# Patient Record
Sex: FEMALE | Race: ASIAN | Employment: FULL TIME | URBAN - METROPOLITAN AREA
[De-identification: names, ages, dates, MRNs, and addresses within clinical notes are randomized per-mention and may not be internally consistent; named-entity substitution may affect disease eponyms.]

---

## 2020-10-27 ENCOUNTER — OFFICE VISIT (OUTPATIENT)
Dept: PRIMARY CARE CLINIC | Age: 35
End: 2020-10-27
Payer: COMMERCIAL

## 2020-10-27 VITALS
WEIGHT: 134.4 LBS | RESPIRATION RATE: 16 BRPM | HEART RATE: 81 BPM | TEMPERATURE: 98.6 F | HEIGHT: 62 IN | SYSTOLIC BLOOD PRESSURE: 124 MMHG | BODY MASS INDEX: 24.73 KG/M2 | DIASTOLIC BLOOD PRESSURE: 87 MMHG | OXYGEN SATURATION: 98 %

## 2020-10-27 DIAGNOSIS — J30.89 ENVIRONMENTAL AND SEASONAL ALLERGIES: ICD-10-CM

## 2020-10-27 DIAGNOSIS — Z23 NEED FOR DIPHTHERIA-TETANUS-PERTUSSIS (TDAP) VACCINE: ICD-10-CM

## 2020-10-27 DIAGNOSIS — Z12.4 CERVICAL CANCER SCREENING: ICD-10-CM

## 2020-10-27 DIAGNOSIS — Z00.00 PHYSICAL EXAM: Primary | ICD-10-CM

## 2020-10-27 DIAGNOSIS — Z23 NEEDS FLU SHOT: ICD-10-CM

## 2020-10-27 DIAGNOSIS — E55.9 VITAMIN D DEFICIENCY: ICD-10-CM

## 2020-10-27 PROCEDURE — 90686 IIV4 VACC NO PRSV 0.5 ML IM: CPT | Performed by: INTERNAL MEDICINE

## 2020-10-27 PROCEDURE — 99385 PREV VISIT NEW AGE 18-39: CPT | Performed by: INTERNAL MEDICINE

## 2020-10-27 PROCEDURE — 90471 IMMUNIZATION ADMIN: CPT | Performed by: INTERNAL MEDICINE

## 2020-10-27 NOTE — PROGRESS NOTES
Chief Complaint   Patient presents with   [address]     no concern just moved to the area of 2018 and needs to establish a pcp

## 2020-10-27 NOTE — PROGRESS NOTES
Written by Damari Dobson, as dictated by Dr. Amanuel Louis MD.    Kylee Smalls is a 29 y.o. female. HPI  Pt presents today to establish care. She moved to the area in 2018 and is looking for a PCP. She has 2 sons and works as a  with [address]. Her  works for [address] [employer] . She has no known fhx of cancer and her parents are alive and healthy. She exercises daily by walk-jogging 3 miles, and she is vegetarian. She does not drink alcohol. She has seasonal allergies which cause sore throat sometimes. She takes Zyrtec for this and does not have drowsiness sfx from it. She does not have any issues with constipation of heartburn. Her menstrual cycle is regular and normal, and she does not have any issues with it. She gets leg cramps on the first day of her menstrual cycle. Both of her sons were vaginal deliveries. She does not remember her last Tdap, and the youngest child is 11years old. Her last Pap smear was 5 years ago as well. No current outpatient medications on file prior to visit. No current facility-administered medications on file prior to visit. No Known Allergies    History reviewed. No pertinent past medical history. History reviewed. No pertinent surgical history. History reviewed. No pertinent family history.     Social History     Socioeconomic History    Marital status:      Spouse name: Not on file    Number of children: Not on file    Years of education: Not on file    Highest education level: Not on file   Occupational History    Not on file   Social Needs    Financial resource strain: Not on file    Food insecurity     Worry: Not on file     Inability: Not on file    Transportation needs     Medical: Not on file     Non-medical: Not on file   Tobacco Use    Smoking status: Never Smoker    Smokeless tobacco: Never Used   Substance and Sexual Activity    Alcohol use: Never Frequency: Never    Drug use: Never    Sexual activity: Yes     Partners: Male   Lifestyle    Physical activity     Days per week: Not on file     Minutes per session: Not on file    Stress: Not on file   Relationships    Social connections     Talks on phone: Not on file     Gets together: Not on file     Attends [employer] service: Not on file     Active member of club or organization: Not on file     Attends meetings of clubs or organizations: Not on file     Relationship status: Not on file    Intimate partner violence     Fear of current or ex partner: Not on file     Emotionally abused: Not on file     Physically abused: Not on file     Forced sexual activity: Not on file   Other Topics Concern    Not on file   Social History Narrative    Not on file       No results found for any previous visit. Review of Systems   Constitutional: Negative for malaise/fatigue and weight loss. HENT: Negative for congestion and sore throat. Eyes: Negative for blurred vision. Respiratory: Negative for cough and shortness of breath. Cardiovascular: Negative for chest pain and leg swelling. Gastrointestinal: Negative for constipation and heartburn. Genitourinary: Negative for frequency and urgency. Musculoskeletal: Negative for back pain, joint pain and myalgias. Neurological: Negative for dizziness and headaches. Endo/Heme/Allergies: Positive for environmental allergies. Psychiatric/Behavioral: Negative for depression. The patient is not nervous/anxious and does not have insomnia. Visit Vitals  /87 (BP 1 Location: Left arm, BP Patient Position: Sitting)   Pulse 81   Temp 98.6 °F (37 °C) (Oral)   Resp 16   Ht 5' 2\" (1.575 m)   Wt 134 lb 6.4 oz (61 kg)   LMP 10/21/2020   SpO2 98%   BMI 24.58 kg/m²     Physical Exam  Vitals signs and nursing note reviewed. Constitutional:       General: She is not in acute distress. Appearance: Normal appearance.  She is well-developed and well-groomed. She is not diaphoretic. HENT:      Right Ear: External ear normal.      Left Ear: External ear normal.   Eyes:      General: No scleral icterus. Right eye: No discharge. Left eye: No discharge. Extraocular Movements: Extraocular movements intact. Neck:      Musculoskeletal: Normal range of motion and neck supple. Cardiovascular:      Rate and Rhythm: Normal rate and regular rhythm. Pulmonary:      Effort: Pulmonary effort is normal.      Breath sounds: Normal breath sounds. No wheezing. Musculoskeletal:      Right lower leg: No edema. Left lower leg: No edema. Lymphadenopathy:      Cervical: No cervical adenopathy. Neurological:      Mental Status: She is alert and oriented to person, place, and time. Psychiatric:         Mood and Affect: Mood and affect normal.         Behavior: Behavior normal.       ASSESSMENT and PLAN    ICD-10-CM ICD-9-CM    1. Physical exam  S74.72 C80.1 METABOLIC PANEL, COMPREHENSIVE      CBC W/O DIFF      LIPID PANEL      TSH 3RD GENERATION    Complete physical exam done. Pt will return during lab hours to have basic fasting labs drawn. 2. Needs flu shot  Z23 V04.81 INFLUENZA VIRUS VAC QUAD,SPLIT,PRESV FREE SYRINGE IM administered in office today. Pt tolerated well. 3. Environmental and seasonal allergies  J30.89 477.8 Stable on Zyrtec. 4. Need for diphtheria-tetanus-pertussis (Tdap) vaccine  Z23 V06.1 diphth,pertus,acell,,tetanus (BOOSTRIX TDAP) 2.5-8-5 Lf-mcg-Lf/0.5mL susp suspension sent to pharmacy. Potential side effects were discussed. I prescribed the Tdap vaccine for health maintenance. 5. Cervical cancer screening  Z12.4 V76.2 REFERRAL TO OBSTETRICS AND GYNECOLOGY    I referred her to an OBGYN for a Pap smear and breast exam.     6. Vitamin D deficiency  E55.9 268.9 VITAMIN D, 25 HYDROXY    Check vitamin D. This plan was reviewed with the patient and patient agrees. All questions were answered.     This scribe documentation was reviewed by me and accurately reflects the examination and decisions made by me.

## 2020-10-28 DIAGNOSIS — E55.9 VITAMIN D DEFICIENCY: ICD-10-CM

## 2020-10-28 DIAGNOSIS — Z00.00 PHYSICAL EXAM: ICD-10-CM

## 2020-10-28 LAB
25(OH)D3 SERPL-MCNC: 22.3 NG/ML (ref 30–100)
ALBUMIN SERPL-MCNC: 3.9 G/DL (ref 3.5–5)
ALBUMIN/GLOB SERPL: 1.1 {RATIO} (ref 1.1–2.2)
ALP SERPL-CCNC: 65 U/L (ref 45–117)
ALT SERPL-CCNC: 19 U/L (ref 12–78)
ANION GAP SERPL CALC-SCNC: 6 MMOL/L (ref 5–15)
AST SERPL-CCNC: 19 U/L (ref 15–37)
BILIRUB SERPL-MCNC: 0.5 MG/DL (ref 0.2–1)
BUN SERPL-MCNC: 7 MG/DL (ref 6–20)
BUN/CREAT SERPL: 10 (ref 12–20)
CALCIUM SERPL-MCNC: 9.1 MG/DL (ref 8.5–10.1)
CHLORIDE SERPL-SCNC: 105 MMOL/L (ref 97–108)
CHOLEST SERPL-MCNC: 176 MG/DL
CO2 SERPL-SCNC: 29 MMOL/L (ref 21–32)
CREAT SERPL-MCNC: 0.72 MG/DL (ref 0.55–1.02)
ERYTHROCYTE [DISTWIDTH] IN BLOOD BY AUTOMATED COUNT: 13.1 % (ref 11.5–14.5)
GLOBULIN SER CALC-MCNC: 3.5 G/DL (ref 2–4)
GLUCOSE SERPL-MCNC: 86 MG/DL (ref 65–100)
HCT VFR BLD AUTO: 41.6 % (ref 35–47)
HDLC SERPL-MCNC: 61 MG/DL
HDLC SERPL: 2.9 {RATIO} (ref 0–5)
HGB BLD-MCNC: 13.3 G/DL (ref 11.5–16)
LDLC SERPL CALC-MCNC: 87.2 MG/DL (ref 0–100)
LIPID PROFILE,FLP: NORMAL
MCH RBC QN AUTO: 28.1 PG (ref 26–34)
MCHC RBC AUTO-ENTMCNC: 32 G/DL (ref 30–36.5)
MCV RBC AUTO: 87.9 FL (ref 80–99)
NRBC # BLD: 0 K/UL (ref 0–0.01)
NRBC BLD-RTO: 0 PER 100 WBC
PLATELET # BLD AUTO: 239 K/UL (ref 150–400)
PMV BLD AUTO: 12.1 FL (ref 8.9–12.9)
POTASSIUM SERPL-SCNC: 4 MMOL/L (ref 3.5–5.1)
PROT SERPL-MCNC: 7.4 G/DL (ref 6.4–8.2)
RBC # BLD AUTO: 4.73 M/UL (ref 3.8–5.2)
SODIUM SERPL-SCNC: 140 MMOL/L (ref 136–145)
TRIGL SERPL-MCNC: 139 MG/DL (ref ?–150)
TSH SERPL DL<=0.05 MIU/L-ACNC: 4.82 UIU/ML (ref 0.36–3.74)
VLDLC SERPL CALC-MCNC: 27.8 MG/DL
WBC # BLD AUTO: 6.3 K/UL (ref 3.6–11)

## 2020-11-03 NOTE — PROGRESS NOTES
Spoke with patient. Informed of lab results and suggested medication by provider patient verbally stated that she understood. Discussed thyroid level. Patient stated that she has never been diagnosed with thyroid issues. Explained that she is to have her tsh repeated in 6 weeks.

## 2020-11-03 NOTE — PROGRESS NOTES
Let her know TSH came back high. If she has never been diagnosed with Thyroid problem then would suggest repeating levels in 6 weeks. Should take Vitamin D 3 1000 I.u daily dose.

## 2021-10-20 ENCOUNTER — OFFICE VISIT (OUTPATIENT)
Dept: PRIMARY CARE CLINIC | Age: 36
End: 2021-10-20
Payer: COMMERCIAL

## 2021-10-20 VITALS
BODY MASS INDEX: 25.98 KG/M2 | RESPIRATION RATE: 15 BRPM | OXYGEN SATURATION: 100 % | WEIGHT: 141.2 LBS | TEMPERATURE: 97.5 F | DIASTOLIC BLOOD PRESSURE: 84 MMHG | SYSTOLIC BLOOD PRESSURE: 134 MMHG | HEART RATE: 88 BPM | HEIGHT: 62 IN

## 2021-10-20 DIAGNOSIS — E01.0 THYROMEGALY: ICD-10-CM

## 2021-10-20 DIAGNOSIS — Z11.59 NEED FOR HEPATITIS C SCREENING TEST: ICD-10-CM

## 2021-10-20 DIAGNOSIS — Z00.00 PHYSICAL EXAM: Primary | ICD-10-CM

## 2021-10-20 DIAGNOSIS — Z12.4 CERVICAL CANCER SCREENING: ICD-10-CM

## 2021-10-20 DIAGNOSIS — E55.9 VITAMIN D DEFICIENCY: ICD-10-CM

## 2021-10-20 DIAGNOSIS — Z23 ENCOUNTER FOR IMMUNIZATION: ICD-10-CM

## 2021-10-20 PROCEDURE — 99395 PREV VISIT EST AGE 18-39: CPT | Performed by: INTERNAL MEDICINE

## 2021-10-20 PROCEDURE — 90471 IMMUNIZATION ADMIN: CPT | Performed by: INTERNAL MEDICINE

## 2021-10-20 PROCEDURE — 90686 IIV4 VACC NO PRSV 0.5 ML IM: CPT | Performed by: INTERNAL MEDICINE

## 2021-10-20 NOTE — PROGRESS NOTES
Paulette Carosn (: 1985) is a 28 y.o. female, established patient, here for evaluation of the following chief complaint(s):  Physical     Written by Batool Dubose, as dictated by Dr. Oly Borges MD.      ASSESSMENT/PLAN:  Below is the assessment and plan developed based on review of pertinent history, physical exam, labs, studies, and medications. 1. Physical exam  Complete physical done today. Routine lab work ordered. Waiting for results. -     METABOLIC PANEL, COMPREHENSIVE; Future  -     CBC W/O DIFF; Future  -     LIPID PANEL; Future  -     TSH 3RD GENERATION; Future    2. Cervical cancer screening  Completed a Pap smear in the office today. Waiting for results. -     PAP IG, APTIMA HPV AND RFX 16/18,45 ([id number]); Future    3. Encounter for immunization  Administered an influenza vaccine in the office today. She tolerated the vaccine well. -     INFLUENZA VIRUS VAC QUAD,SPLIT,PRESV FREE SYRINGE IM    4. Need for hepatitis C screening test  Ordered Hepatitis C test. Waiting for results.  -     HEPATITIS C AB; Future    5. Vitamin D deficiency  Ordered lab work to check Vitamin D levels. Waiting for results. Recommend that patient take a Vitamin D supplement of 1,000 units daily. -     VITAMIN D, 25 HYDROXY; Future    6. Thyromegaly  Ordered an ultrasound of the thyroid and parathyroid to be completed. Waiting for results. -     US THYROID/PARATHYROID/SOFT TISS; Future      SUBJECTIVE/OBJECTIVE:  HPI     Patient presents today for a complete physical exam.     She is not followed by OBGYN. She has not completed a Pap smear since she was pregnant which was 5.5 years ago. She does not take oral contraceptives. She c/o seasonal allergies. She does not take medication on a regular basis, she takes OTC Zyrtec when her sxs are bad. She notes occasional constipation. She does not take OTC Vitamin D supplement. No current outpatient medications on file prior to visit. No current facility-administered medications on file prior to visit. No Known Allergies      Social History     Socioeconomic History    Marital status:      Spouse name: Not on file    Number of children: Not on file    Years of education: Not on file    Highest education level: Not on file   Occupational History    Not on file   Tobacco Use    Smoking status: Never Smoker    Smokeless tobacco: Never Used   Vaping Use    Vaping Use: Never used   Substance and Sexual Activity    Alcohol use: Never    Drug use: Never    Sexual activity: Yes     Partners: Male   Other Topics Concern    Not on file   Social History Narrative    Not on file     Social Determinants of Health     Financial Resource Strain:     Difficulty of Paying Living Expenses:    Food Insecurity:     Worried About Running Out of Food in the Last Year:     [address] in the Last Year:    Transportation Needs:     Lack of Transportation (Medical):  Lack of Transportation (Non-Medical):    Physical Activity:     Days of Exercise per Week:     Minutes of Exercise per Session:    Stress:     Feeling of Stress :    Social Connections:     Frequency of Communication with Friends and Family:     Frequency of Social Gatherings with Friends and Family:     Attends Sabianist Services:     Active Member of Clubs or Organizations:     Attends Club or Organization Meetings:     Marital Status:    Intimate Partner Violence:     Fear of Current or Ex-Partner:     Emotionally Abused:     Physically Abused:     Sexually Abused:        No visits with results within 3 Month(s) from this visit.    Latest known visit with results is:   Orders Only on 10/28/2020   Component Date Value Ref Range Status    Sodium 10/28/2020 140  136 - 145 mmol/L Final    Potassium 10/28/2020 4.0  3.5 - 5.1 mmol/L Final    Chloride 10/28/2020 105  97 - 108 mmol/L Final    CO2 10/28/2020 29  21 - 32 mmol/L Final    Anion gap 10/28/2020 6  5 - 15 mmol/L Final    Glucose 10/28/2020 86  65 - 100 mg/dL Final    BUN 10/28/2020 7  6 - 20 MG/DL Final    Creatinine 10/28/2020 0.72  0.55 - 1.02 MG/DL Final    BUN/Creatinine ratio 10/28/2020 10* 12 - 20   Final    GFR est AA 10/28/2020 >60  >60 ml/min/1.73m2 Final    GFR est non-AA 10/28/2020 >60  >60 ml/min/1.73m2 Final    Comment: Estimated GFR is calculated using the IDMS-traceable Modification of Diet in  Renal Disease (MDRD) Study equation, reported for both  Americans  (GFRAA) and non- Americans (GFRNA), and normalized to 1.73m2 body  surface area. The physician must decide which value applies to the patient.  Calcium 10/28/2020 9.1  8.5 - 10.1 MG/DL Final    Bilirubin, total 10/28/2020 0.5  0.2 - 1.0 MG/DL Final    ALT (SGPT) 10/28/2020 19  12 - 78 U/L Final    AST (SGOT) 10/28/2020 19  15 - 37 U/L Final    Alk.  phosphatase 10/28/2020 65  45 - 117 U/L Final    Protein, total 10/28/2020 7.4  6.4 - 8.2 g/dL Final    Albumin 10/28/2020 3.9  3.5 - 5.0 g/dL Final    Globulin 10/28/2020 3.5  2.0 - 4.0 g/dL Final    A-G Ratio 10/28/2020 1.1  1.1 - 2.2   Final    WBC 10/28/2020 6.3  3.6 - 11.0 K/uL Final    RBC 10/28/2020 4.73  3.80 - 5.20 M/uL Final    HGB 10/28/2020 13.3  11.5 - 16.0 g/dL Final    HCT 10/28/2020 41.6  35.0 - 47.0 % Final    MCV 10/28/2020 87.9  80.0 - 99.0 FL Final    MCH 10/28/2020 28.1  26.0 - 34.0 PG Final    MCHC 10/28/2020 32.0  30.0 - 36.5 g/dL Final    RDW 10/28/2020 13.1  11.5 - 14.5 % Final    PLATELET 52/69/3376 998  150 - 400 K/uL Final    MPV 10/28/2020 12.1  8.9 - 12.9 FL Final    NRBC 10/28/2020 0.0  0  WBC Final    ABSOLUTE NRBC 10/28/2020 0.00  0.00 - 0.01 K/uL Final    LIPID PROFILE 10/28/2020        Final    Cholesterol, total 10/28/2020 176  <200 MG/DL Final    Triglyceride 10/28/2020 139  <150 MG/DL Final    Comment: Based on NCEP-ATP III:  Triglycerides <150 mg/dL  is considered normal, 150-199  mg/dL  borderline high,  200-499 mg/dL high and  greater than or equal to 500  mg/dL very high.  HDL Cholesterol 10/28/2020 61  MG/DL Final    Comment: Based on NCEP ATP III, HDL Cholesterol <40 mg/dL is considered low and >60  mg/dL is elevated.  LDL, calculated 10/28/2020 87.2  0 - 100 MG/DL Final    Comment: Based on the NCEP-ATP: LDL-C concentrations are considered  optimal <100 mg/dL,  near optimal/above Normal 100-129 mg/dL Borderline High: 130-159, High: 160-189  mg/dL Very High: Greater than or equal to 190 mg/dL      VLDL, calculated 10/28/2020 27.8  MG/DL Final    CHOL/HDL Ratio 10/28/2020 2.9  0.0 - 5.0   Final    TSH 10/28/2020 4.82* 0.36 - 3.74 uIU/mL Final    Comment:   Due to TSH heterogeneity, both structurally and degree of glycosylation,  monoclonal antibodies used in the TSH assay may not accurately quantitate TSH. Therefore, this result should be correlated with clinical findings as well as  with other assessments of thyroid function, e.g., free T4, free T3.  Vitamin D 25-Hydroxy 10/28/2020 22.3* 30 - 100 ng/mL Final    Comment: (NOTE)  Deficiency               <20 ng/mL  Insufficiency          20-30 ng/mL  Sufficient             ng/mL  Possible toxicity       >100 ng/mL    The Method used is Siemens Advia Centaur currently standardized to a   Center of Disease Control and Prevention (CDC) certified reference   [address]. Samples containing fluorescein dye can produce falsely   elevated values when tested with the ADVIA Centaur Vitamin D Assay. It is recommended that results in the toxic range, >100 ng/mL, be   retested 72 hours post fluorescein exposure. Review of Systems   Constitutional: Negative for activity change, fatigue and unexpected weight change. HENT: Negative for congestion, hearing loss, rhinorrhea and sore throat. Eyes: Negative for discharge. Respiratory: Negative for cough, chest tightness and shortness of breath. Cardiovascular: Negative for leg swelling. Gastrointestinal: Positive for constipation. Negative for abdominal pain and diarrhea. Genitourinary: Negative for dysuria, flank pain, frequency and urgency. Musculoskeletal: Negative for arthralgias, back pain and myalgias. Skin: Negative for color change and rash. Allergic/Immunologic: Positive for environmental allergies. Neurological: Negative for dizziness, light-headedness and headaches. Psychiatric/Behavioral: Negative for dysphoric mood and sleep disturbance. The patient is not nervous/anxious. Visit Vitals  /84 (BP 1 Location: Left arm)   Pulse 88   Temp 97.5 °F (36.4 °C)   Resp 15   Ht 5' 2\" (1.575 m)   Wt 141 lb 3.2 oz (64 kg)   LMP 10/11/2021 (Approximate)   SpO2 100%   BMI 25.83 kg/m²       Physical Exam  Vitals and nursing note reviewed. Constitutional:       General: She is not in acute distress. Appearance: Normal appearance. She is normal weight. She is not diaphoretic. HENT:      Right Ear: Tympanic membrane, ear canal and external ear normal.      Left Ear: Tympanic membrane, ear canal and external ear normal.      Mouth/Throat:      Mouth: Mucous membranes are moist.      Pharynx: Oropharynx is clear. Eyes:      General:         Right eye: No discharge. Left eye: No discharge. Extraocular Movements: Extraocular movements intact. Conjunctiva/sclera: Conjunctivae normal.   Neck:      Thyroid: Thyromegaly present. Cardiovascular:      Rate and Rhythm: Normal rate and regular rhythm. Pulses: Normal pulses. Dorsalis pedis pulses are 2+ on the right side and 2+ on the left side. Pulmonary:      Effort: Pulmonary effort is normal.      Breath sounds: Normal breath sounds. No wheezing. Chest:      Breasts:         Right: Normal. No inverted nipple, nipple discharge, skin change or tenderness. Left: Normal. No inverted nipple, nipple discharge, skin change or tenderness.    Abdominal:      General: Bowel sounds are normal. There is no distension. Palpations: Abdomen is soft. Tenderness: There is no abdominal tenderness. Musculoskeletal:      Right lower leg: No edema. Left lower leg: No edema. Comments: B/L knees without crepitus   Lymphadenopathy:      Cervical: No cervical adenopathy. Neurological:      Deep Tendon Reflexes:      Reflex Scores:       Patellar reflexes are 2+ on the right side and 2+ on the left side. Psychiatric:         Mood and Affect: Mood and affect normal.           An electronic signature was used to authenticate this note.   -- Bj Barone

## 2021-10-20 NOTE — PROGRESS NOTES
Chief Complaint   Patient presents with    Physical       Visit Vitals  /84 (BP 1 Location: Left arm)   Pulse 88   Temp 97.5 °F (36.4 °C)   Resp 15   Ht 5' 2\" (1.575 m)   Wt 141 lb 3.2 oz (64 kg)   LMP 10/11/2021 (Approximate)   SpO2 100%   BMI 25.83 kg/m²       1. Have you been to the ER, urgent care clinic since your last visit? Hospitalized since your last visit? No    2. Have you seen or consulted any other health care providers outside of the [address] [place] [address] since your last visit? Include any pap smears or colon screening.  Yes Dentist

## 2021-10-21 LAB
25(OH)D3+25(OH)D2 SERPL-MCNC: 23.4 NG/ML (ref 30–100)
ALBUMIN SERPL-MCNC: 4.7 G/DL (ref 3.8–4.8)
ALBUMIN/GLOB SERPL: 1.6 {RATIO} (ref 1.2–2.2)
ALP SERPL-CCNC: 64 IU/L (ref 44–121)
ALT SERPL-CCNC: 13 IU/L (ref 0–32)
AST SERPL-CCNC: 25 IU/L (ref 0–40)
BILIRUB SERPL-MCNC: 0.4 MG/DL (ref 0–1.2)
BUN SERPL-MCNC: 7 MG/DL (ref 6–20)
BUN/CREAT SERPL: 10 (ref 9–23)
CALCIUM SERPL-MCNC: 9.1 MG/DL (ref 8.7–10.2)
CHLORIDE SERPL-SCNC: 101 MMOL/L (ref 96–106)
CHOLEST SERPL-MCNC: 203 MG/DL (ref 100–199)
CO2 SERPL-SCNC: 24 MMOL/L (ref 20–29)
CREAT SERPL-MCNC: 0.72 MG/DL (ref 0.57–1)
ERYTHROCYTE [DISTWIDTH] IN BLOOD BY AUTOMATED COUNT: 14.4 % (ref 11.7–15.4)
GLOBULIN SER CALC-MCNC: 2.9 G/DL (ref 1.5–4.5)
GLUCOSE SERPL-MCNC: 82 MG/DL (ref 65–99)
HCT VFR BLD AUTO: 35.3 % (ref 34–46.6)
HCV AB S/CO SERPL IA: <0.1 S/CO RATIO (ref 0–0.9)
HDLC SERPL-MCNC: 57 MG/DL
HGB BLD-MCNC: 11.1 G/DL (ref 11.1–15.9)
LDLC SERPL CALC-MCNC: 126 MG/DL (ref 0–99)
MCH RBC QN AUTO: 23.4 PG (ref 26.6–33)
MCHC RBC AUTO-ENTMCNC: 31.4 G/DL (ref 31.5–35.7)
MCV RBC AUTO: 74 FL (ref 79–97)
PLATELET # BLD AUTO: 321 X10E3/UL (ref 150–450)
POTASSIUM SERPL-SCNC: 4.3 MMOL/L (ref 3.5–5.2)
PROT SERPL-MCNC: 7.6 G/DL (ref 6–8.5)
RBC # BLD AUTO: 4.75 X10E6/UL (ref 3.77–5.28)
SODIUM SERPL-SCNC: 140 MMOL/L (ref 134–144)
TRIGL SERPL-MCNC: 112 MG/DL (ref 0–149)
TSH SERPL DL<=0.005 MIU/L-ACNC: 2.52 UIU/ML (ref 0.45–4.5)
VLDLC SERPL CALC-MCNC: 20 MG/DL (ref 5–40)
WBC # BLD AUTO: 6.3 X10E3/UL (ref 3.4–10.8)

## 2021-10-25 NOTE — PROGRESS NOTES
Results reviewed. Release via [address], [place] you are doing well. Your blood work is back and cholesterol came little elevated. Your TSH ( thyroid number) came back fine. Vitamin D came low. I would recommend low carb diet ( avoiding Rice , bread and pasta) , exercise ( at least 1 mile walk ) 3-4 times a week. Also , please take Vitamin D3 1000 I.U daily dose.

## 2021-10-27 LAB
CYTOLOGIST CVX/VAG CYTO: NORMAL
CYTOLOGY CVX/VAG DOC CYTO: NORMAL
CYTOLOGY CVX/VAG DOC THIN PREP: NORMAL
DX ICD CODE: NORMAL
HPV I/H RISK 4 DNA CVX QL PROBE+SIG AMP: NEGATIVE
Lab: NORMAL
OTHER STN SPEC: NORMAL
STAT OF ADQ CVX/VAG CYTO-IMP: NORMAL

## 2021-10-28 NOTE — PROGRESS NOTES
Results reviewed. Release via [employer] [address], [place]!  Pap smear came back normal.

## 2021-11-10 ENCOUNTER — TELEPHONE (OUTPATIENT)
Dept: PRIMARY CARE CLINIC | Age: 36
End: 2021-11-10

## 2021-11-10 NOTE — TELEPHONE ENCOUNTER
Spoke with patient on phone, patient wanting to know about claims for labs through insurance.  I told the patient that it a question to ask via her insurance company to ask about claims

## 2021-11-10 NOTE — TELEPHONE ENCOUNTER
Patient called to check if [facility] sent a separate claim for her labs this year like she did last year?

## 2022-12-02 ENCOUNTER — OFFICE VISIT (OUTPATIENT)
Dept: PRIMARY CARE CLINIC | Age: 37
End: 2022-12-02
Payer: MEDICAID

## 2022-12-02 VITALS
DIASTOLIC BLOOD PRESSURE: 74 MMHG | RESPIRATION RATE: 16 BRPM | BODY MASS INDEX: 27.23 KG/M2 | HEART RATE: 97 BPM | TEMPERATURE: 97.5 F | OXYGEN SATURATION: 98 % | SYSTOLIC BLOOD PRESSURE: 116 MMHG | HEIGHT: 62 IN | WEIGHT: 148 LBS

## 2022-12-02 DIAGNOSIS — Z00.00 PHYSICAL EXAM: Primary | ICD-10-CM

## 2022-12-02 DIAGNOSIS — E01.0 THYROMEGALY: ICD-10-CM

## 2022-12-02 DIAGNOSIS — E55.9 VITAMIN D DEFICIENCY: ICD-10-CM

## 2022-12-02 NOTE — PROGRESS NOTES
Subjective:   40 y.o. female for Well Woman Check. Her pap smear was normal last year. There is no problem list on file for this patient. Current Outpatient Medications   Medication Sig Dispense Refill    cholecalciferol, vitamin D3, (VITAMIN D3 PO) Take  by mouth. No Known Allergies  History reviewed. No pertinent past medical history. History reviewed. No pertinent surgical history. History reviewed. No pertinent family history. Social History     Tobacco Use    Smoking status: Never    Smokeless tobacco: Never   Substance Use Topics    Alcohol use: Never        Lab Results   Component Value Date/Time    WBC 6.3 10/20/2021 12:00 AM    HGB 11.1 10/20/2021 12:00 AM    HCT 35.3 10/20/2021 12:00 AM    PLATELET 561 90/12/6679 12:00 AM    MCV 74 (L) 10/20/2021 12:00 AM     Lab Results   Component Value Date/Time    Glucose 82 10/20/2021 12:00 AM    LDL, calculated 126 (H) 10/20/2021 12:00 AM    LDL, calculated 87.2 10/28/2020 07:28 AM    Creatinine 0.72 10/20/2021 12:00 AM      Lab Results   Component Value Date/Time    Cholesterol, total 203 (H) 10/20/2021 12:00 AM    HDL Cholesterol 57 10/20/2021 12:00 AM    LDL, calculated 126 (H) 10/20/2021 12:00 AM    LDL, calculated 87.2 10/28/2020 07:28 AM    Triglyceride 112 10/20/2021 12:00 AM    CHOL/HDL Ratio 2.9 10/28/2020 07:28 AM     Lab Results   Component Value Date/Time    ALT (SGPT) 13 10/20/2021 12:00 AM    Alk.  phosphatase 64 10/20/2021 12:00 AM    Bilirubin, total 0.4 10/20/2021 12:00 AM    Albumin 4.7 10/20/2021 12:00 AM    Protein, total 7.6 10/20/2021 12:00 AM    PLATELET 910 78/04/2989 12:00 AM       Lab Results   Component Value Date/Time    GFR est non- 10/20/2021 12:00 AM    GFR est  10/20/2021 12:00 AM    Creatinine 0.72 10/20/2021 12:00 AM    BUN 7 10/20/2021 12:00 AM    Sodium 140 10/20/2021 12:00 AM    Potassium 4.3 10/20/2021 12:00 AM    Chloride 101 10/20/2021 12:00 AM    CO2 24 10/20/2021 12:00 AM     Lab Results Component Value Date/Time    TSH 2.520 10/20/2021 12:00 AM         ROS: Feeling generally well. No TIA's or unusual headaches, no dysphagia. No prolonged cough. No dyspnea or chest pain on exertion. No abdominal pain, change in bowel habits, black or bloody stools. No urinary tract symptoms. No new or unusual musculoskeletal symptoms. Specific concerns today: none . Objective: The patient appears well, alert, oriented x 3, in no distress. Visit Vitals  /74 (BP 1 Location: Left upper arm, BP Patient Position: Sitting)   Pulse 97   Temp 97.5 °F (36.4 °C) (Temporal)   Resp 16   Ht 5' 2\" (1.575 m)   Wt 148 lb (67.1 kg)   SpO2 98%   BMI 27.07 kg/m²     ENT normal.  Neck supple. No adenopathy . , positive thyromegaly. MARIAM. Lungs are clear, good air entry, no wheezes, rhonchi or rales. S1 and S2 normal, no murmurs, regular rate and rhythm. Abdomen soft without tenderness, guarding, mass or organomegaly. Extremities show no edema, normal peripheral pulses. Neurological is normal, no focal findings. Breast and Pelvic exams are deferred. Assessment/Plan:   Well Woman  follow low fat diet, continue present plan, routine labs ordered, call if any problems    ICD-10-CM ICD-9-CM    1. Physical exam  Z00.00 V70.9 Complete physical exam done. Basic labs ordered. 2. Vitamin D deficiency  E55.9 268.9 Vitamin D 25 ordered.        3. Thyromegaly  E01.0 240.9 US THYROID/PARATHYROID/SOFT TISS

## 2022-12-02 NOTE — PROGRESS NOTES
Emily Granados (: 1985) is a 40 y.o. female, established patient, here for evaluation of the following chief complaint(s):  No chief complaint on file. ASSESSMENT/PLAN:  Below is the assessment and plan developed based on review of pertinent history, physical exam, labs, studies, and medications. {There are no diagnoses linked to this encounter. (Refresh or delete this SmartLink)}    No follow-ups on file. SUBJECTIVE/OBJECTIVE:  HPI  Patient presents today for a follow up and physical.          There is no problem list on file for this patient. No current outpatient medications on file prior to visit. No current facility-administered medications on file prior to visit. No Known Allergies    No past medical history on file. No past surgical history on file. No family history on file. Social History     Socioeconomic History    Marital status:      Spouse name: Not on file    Number of children: Not on file    Years of education: Not on file    Highest education level: Not on file   Occupational History    Not on file   Tobacco Use    Smoking status: Never    Smokeless tobacco: Never   Vaping Use    Vaping Use: Never used   Substance and Sexual Activity    Alcohol use: Never    Drug use: Never    Sexual activity: Yes     Partners: Male   Other Topics Concern    Not on file   Social History Narrative    Not on file     Social Determinants of Health     Financial Resource Strain: Not on file   Food Insecurity: Not on file   Transportation Needs: Not on file   Physical Activity: Not on file   Stress: Not on file   Social Connections: Not on file   Intimate Partner Violence: Not on file   Housing Stability: Not on file       No visits with results within 3 Month(s) from this visit.    Latest known visit with results is:   Office Visit on 10/20/2021   Component Date Value Ref Range Status    Hep C Virus Ab 10/20/2021 <0.1  0.0 - 0.9 s/co ratio Final    Comment: Negative:     < 0.8                               Indeterminate: 0.8 - 0.9                                    Positive:     > 0.9   The CDC recommends that a positive HCV antibody result   be followed up with a HCV Nucleic Acid Amplification   test (880713). Glucose 10/20/2021 82  65 - 99 mg/dL Final    BUN 10/20/2021 7  6 - 20 mg/dL Final    Creatinine 10/20/2021 0.72  0.57 - 1.00 mg/dL Final    GFR est non-AA 10/20/2021 109  >59 mL/min/1.73 Final    GFR est AA 10/20/2021 125  >59 mL/min/1.73 Final    Comment: **In accordance with recommendations from the NKF-ASN Task force,**    [facility] is in the process of updating its eGFR calculation to the    2021 CKD-EPI creatinine equation that estimates kidney function    without a race variable. BUN/Creatinine ratio 10/20/2021 10  9 - 23 Final    Sodium 10/20/2021 140  134 - 144 mmol/L Final    Potassium 10/20/2021 4.3  3.5 - 5.2 mmol/L Final    Chloride 10/20/2021 101  96 - 106 mmol/L Final    CO2 10/20/2021 24  20 - 29 mmol/L Final    Calcium 10/20/2021 9.1  8.7 - 10.2 mg/dL Final    Protein, total 10/20/2021 7.6  6.0 - 8.5 g/dL Final    Albumin 10/20/2021 4.7  3.8 - 4.8 g/dL Final    GLOBULIN, TOTAL 10/20/2021 2.9  1.5 - 4.5 g/dL Final    A-G Ratio 10/20/2021 1.6  1.2 - 2.2 Final    Bilirubin, total 10/20/2021 0.4  0.0 - 1.2 mg/dL Final    Alk.  phosphatase 10/20/2021 64  44 - 121 IU/L Final                  **Please note reference interval change**    AST (SGOT) 10/20/2021 25  0 - 40 IU/L Final    ALT (SGPT) 10/20/2021 13  0 - 32 IU/L Final    WBC 10/20/2021 6.3  3.4 - 10.8 x10E3/uL Final    RBC 10/20/2021 4.75  3.77 - 5.28 x10E6/uL Final    HGB 10/20/2021 11.1  11.1 - 15.9 g/dL Final    HCT 10/20/2021 35.3  34.0 - 46.6 % Final    MCV 10/20/2021 74 (A)  79 - 97 fL Final    MCH 10/20/2021 23.4 (A)  26.6 - 33.0 pg Final    MCHC 10/20/2021 31.4 (A)  31.5 - 35.7 g/dL Final    RDW 10/20/2021 14.4  11.7 - 15.4 % Final    PLATELET 83/35/5654 321  150 - 450 x10E3/uL Final    Cholesterol, total 10/20/2021 203 (A)  100 - 199 mg/dL Final    Triglyceride 10/20/2021 112  0 - 149 mg/dL Final    HDL Cholesterol 10/20/2021 57  >39 mg/dL Final    VLDL, calculated 10/20/2021 20  5 - 40 mg/dL Final    LDL, calculated 10/20/2021 126 (A)  0 - 99 mg/dL Final    TSH 10/20/2021 2.520  0.450 - 4.500 uIU/mL Final    VITAMIN D, 25-HYDROXY 10/20/2021 23.4 (A)  30.0 - 100.0 ng/mL Final    Comment: Vitamin D deficiency has been defined by the [address] practice guideline as a  level of serum 25-OH vitamin D less than 20 ng/mL (1,2). The Endocrine Society went on to further define vitamin D  insufficiency as a level between 21 and 29 ng/mL (2). 1. IOM ([place] of Medicine). 2010. Dietary reference     intakes for calcium and D. [facility]: The     mVisum. 2. Tiff MF, Effie GONZALEZ, Gomez HARVEY, et al.     Evaluation, treatment, and prevention of vitamin D     deficiency: an Endocrine Society clinical practice     guideline. JCEM. 2011 Jul; 96(7):1911-30. Diagnosis 10/20/2021 Comment   Final    NEGATIVE FOR INTRAEPITHELIAL LESION OR MALIGNANCY. Specimen adequacy 10/20/2021 Comment   Final    Comment: Satisfactory for evaluation. Endocervical and/or squamous metaplastic  cells (endocervical component) are present. Clinician provided ICD10 10/20/2021 Comment   Final    Comment: Z12.4  Z00.00  Z11.59  E55.9      Performed by: 10/20/2021 Comment   Final    Calli Flowers, Cytotechnologist (ASCP)    . 10/20/2021 . Final    Note: 10/20/2021 Comment   Final    Comment: The Pap smear is a screening test designed to aid in the detection of  premalignant and malignant conditions of the uterine cervix. It is not a  diagnostic procedure and should not be used as the sole means of detecting  cervical cancer. Both false-positive and false-negative reports do occur.       Test methodology 10/20/2021 Comment Final    Comment: This liquid based ThinPrep(R) pap test was screened with the  use of an image guided system. HPV APTIMA 10/20/2021 Negative  Negative Final    Comment: This nucleic acid amplification test detects fourteen high-risk  HPV types (16,18,31,33,35,39,45,51,52,56,58,59,66,68) without  differentiation. Review of Systems   Constitutional:  Negative for activity change, fatigue and unexpected weight change. HENT:  Negative for congestion, hearing loss, rhinorrhea and sore throat. Eyes:  Negative for discharge. Respiratory:  Negative for cough, chest tightness and shortness of breath. Cardiovascular:  Negative for leg swelling. Gastrointestinal:  Negative for abdominal pain, constipation and diarrhea. Genitourinary:  Negative for dysuria, flank pain, frequency and urgency. Musculoskeletal:  Negative for arthralgias, back pain and myalgias. Skin:  Negative for color change and rash. Neurological:  Negative for dizziness, light-headedness and headaches. Psychiatric/Behavioral:  Negative for dysphoric mood and sleep disturbance. The patient is not nervous/anxious. There were no vitals taken for this visit. Physical Exam  Vitals and nursing note reviewed. Constitutional:       General: She is not in acute distress. Appearance: Normal appearance. She is normal weight. She is not diaphoretic. HENT:      Right Ear: Tympanic membrane, ear canal and external ear normal.      Left Ear: Tympanic membrane, ear canal and external ear normal.      Mouth/Throat:      Mouth: Mucous membranes are moist.      Pharynx: Oropharynx is clear. Eyes:      General:         Right eye: No discharge. Left eye: No discharge. Extraocular Movements: Extraocular movements intact. Conjunctiva/sclera: Conjunctivae normal.   Neck:      Thyroid: No thyromegaly. Cardiovascular:      Rate and Rhythm: Normal rate and regular rhythm. Pulses: Normal pulses. Dorsalis pedis pulses are 2+ on the right side and 2+ on the left side. Pulmonary:      Effort: Pulmonary effort is normal.      Breath sounds: Normal breath sounds. No wheezing. Abdominal:      General: Bowel sounds are normal. There is no distension. Palpations: Abdomen is soft. Tenderness: There is no abdominal tenderness. Musculoskeletal:      Right lower leg: No edema. Left lower leg: No edema. Comments: B/L knees without crepitus   Lymphadenopathy:      Cervical: No cervical adenopathy. Neurological:      Deep Tendon Reflexes:      Reflex Scores:       Patellar reflexes are 2+ on the right side and 2+ on the left side. Psychiatric:         Mood and Affect: Mood and affect normal.         I, Jake Womack MD, personally performed the services described in this documentation as scribed by Vicky Caba in my presence, and it is both accurate and complete. An electronic signature was used to authenticate this note.   -- Vicky Caba

## 2022-12-02 NOTE — PROGRESS NOTES
1. \"Have you been to the ER, urgent care clinic since your last visit? Hospitalized since your last visit? \" No    2. \"Have you seen or consulted any other health care providers outside of the [address] [place] [address] since your last visit? \" Yes When: [facility] [employer] [facility]  for her work    3. For patients aged 39-70: Has the patient had a colonoscopy / FIT/ Cologuard? NA - based on age      If the patient is female:    4. For patients aged 41-77: Has the patient had a mammogram within the past 2 years? NA - based on age or sex      11. For patients aged 21-65: Has the patient had a pap smear?  Yes - no Care Gap present     Chief Complaint   Patient presents with    Physical

## 2022-12-05 DIAGNOSIS — N92.1 MENORRHAGIA WITH IRREGULAR CYCLE: Primary | ICD-10-CM

## 2023-10-04 ENCOUNTER — TELEPHONE (OUTPATIENT)
Dept: PRIMARY CARE CLINIC | Facility: CLINIC | Age: 38
End: 2023-10-04

## 2023-10-04 NOTE — TELEPHONE ENCOUNTER
Patient would like to change providers from [facility] to [facility]      ----- Message from Lorin Du sent at 10/4/2023  3:10 PM EDT -----  Subject: Appointment Request    Reason for Call: New Patient/New to Provider Appointment needed: New   Patient Request Appointment    QUESTIONS    Reason for appointment request? Requested Provider unavailable - Seferino Bruno     Additional Information for Provider? Patient would like to schedule her   annual exam (last CPE 12/2/22); unsure if PAP is needed. Patient would   also like to switch PCP's from Tati Dietz to Zaf[facility] [place] if   possible.  Please call the patient to schedule.   ---------------------------------------------------------------------------  --------------  Socrates AHMADI  [phone]; OK to leave message on [employer],OK to respond with electronic   message via [employer] portal (only for patients who have registered [employer]   account)  ---------------------------------------------------------------------------  --------------  SCRIPT ANSWERS

## 2023-10-05 NOTE — TELEPHONE ENCOUNTER
Spoke with patient she stated that her and her  wants to have the same PCP and both wants to switch to Dr Yanci Chapman. Explained to the patient that they can not switch between PCP's also let her know that Dr Johnnie Gonzalez is no longer accepting new patients so once she switches she can switch back.  Patient stated that is fine

## 2023-12-04 ENCOUNTER — OFFICE VISIT (OUTPATIENT)
Dept: PRIMARY CARE CLINIC | Facility: CLINIC | Age: 38
End: 2023-12-04
Payer: COMMERCIAL

## 2023-12-04 VITALS
HEART RATE: 85 BPM | OXYGEN SATURATION: 100 % | RESPIRATION RATE: 19 BRPM | WEIGHT: 139 LBS | SYSTOLIC BLOOD PRESSURE: 129 MMHG | DIASTOLIC BLOOD PRESSURE: 83 MMHG | HEIGHT: 62 IN | BODY MASS INDEX: 25.58 KG/M2

## 2023-12-04 DIAGNOSIS — Z00.00 WELL WOMAN EXAM (NO GYNECOLOGICAL EXAM): ICD-10-CM

## 2023-12-04 DIAGNOSIS — Z00.00 WELL WOMAN EXAM (NO GYNECOLOGICAL EXAM): Primary | ICD-10-CM

## 2023-12-04 PROCEDURE — 99395 PREV VISIT EST AGE 18-39: CPT | Performed by: FAMILY MEDICINE

## 2023-12-04 RX ORDER — MULTIVIT-MIN/IRON/FOLIC ACID/K 18-600-40
CAPSULE ORAL
COMMUNITY

## 2023-12-04 SDOH — ECONOMIC STABILITY: FOOD INSECURITY: WITHIN THE PAST 12 MONTHS, YOU WORRIED THAT YOUR FOOD WOULD RUN OUT BEFORE YOU GOT MONEY TO BUY MORE.: PATIENT DECLINED

## 2023-12-04 SDOH — ECONOMIC STABILITY: INCOME INSECURITY: HOW HARD IS IT FOR YOU TO PAY FOR THE VERY BASICS LIKE FOOD, HOUSING, MEDICAL CARE, AND HEATING?: PATIENT DECLINED

## 2023-12-04 SDOH — HEALTH STABILITY: PHYSICAL HEALTH: ON AVERAGE, HOW MANY MINUTES DO YOU ENGAGE IN EXERCISE AT THIS LEVEL?: 60 MIN

## 2023-12-04 SDOH — ECONOMIC STABILITY: HOUSING INSECURITY
IN THE LAST 12 MONTHS, WAS THERE A TIME WHEN YOU DID NOT HAVE A STEADY PLACE TO SLEEP OR SLEPT IN A SHELTER (INCLUDING NOW)?: PATIENT REFUSED

## 2023-12-04 SDOH — HEALTH STABILITY: PHYSICAL HEALTH: ON AVERAGE, HOW MANY DAYS PER WEEK DO YOU ENGAGE IN MODERATE TO STRENUOUS EXERCISE (LIKE A BRISK WALK)?: 7 DAYS

## 2023-12-04 SDOH — ECONOMIC STABILITY: FOOD INSECURITY: WITHIN THE PAST 12 MONTHS, THE FOOD YOU BOUGHT JUST DIDN'T LAST AND YOU DIDN'T HAVE MONEY TO GET MORE.: PATIENT DECLINED

## 2023-12-04 ASSESSMENT — PATIENT HEALTH QUESTIONNAIRE - PHQ9
2. FEELING DOWN, DEPRESSED OR HOPELESS: 0
SUM OF ALL RESPONSES TO PHQ QUESTIONS 1-9: 0
1. LITTLE INTEREST OR PLEASURE IN DOING THINGS: 0
SUM OF ALL RESPONSES TO PHQ9 QUESTIONS 1 & 2: 0

## 2023-12-04 NOTE — PROGRESS NOTES
HPI:  Evonne Cruz is a 45 y.o. female presenting for well woman exam.     No complaints or new concerns. Diet: vegetarian, eats dairy products home made cheese  Exercise: walks 2 hours daily   Tobacco Use: never  Alcohol Use: none    Allergies- reviewed:   No Known Allergies    Medications- reviewed:   Current Outpatient Medications   Medication Sig    Cholecalciferol (VITAMIN D) 50 MCG (2000 UT) CAPS capsule Take by mouth     No current facility-administered medications for this visit. Past Medical History- reviewed:  No past medical history on file. Past Surgical History- reviewed:   No past surgical history on file. Family History - reviewed:  No family history on file. Social History - reviewed:  Social History     Socioeconomic History    Marital status:      Spouse name: Not on file    Number of children: Not on file    Years of education: Not on file    Highest education level: Not on file   Occupational History    Not on file   Tobacco Use    Smoking status: Never    Smokeless tobacco: Never   Substance and Sexual Activity    Alcohol use: Never    Drug use: Never    Sexual activity: Not on file   Other Topics Concern    Not on file   Social History Narrative    Not on file     Social Determinants of Health     Financial Resource Strain: Unknown (12/4/2023)    Overall Financial Resource Strain (CARDIA)     Difficulty of Paying Living Expenses: Patient refused   Food Insecurity: Unknown (12/4/2023)    Hunger Vital Sign     Worried About Running Out of Food in the Last Year: Patient refused     [address] in the Last Year: Patient refused   Transportation Needs: Unknown (12/4/2023)    PRAPARE - Transportation     Lack of Transportation (Medical):  Not on file     Lack of Transportation (Non-Medical): Patient refused   Physical Activity: Sufficiently Active (12/4/2023)    Exercise Vital Sign     Days of Exercise per Week: 7 days     Minutes of Exercise per Session: 60 min

## 2023-12-05 LAB
ALBUMIN SERPL-MCNC: 4.5 G/DL (ref 3.9–4.9)
ALBUMIN/GLOB SERPL: 1.6 {RATIO} (ref 1.2–2.2)
ALP SERPL-CCNC: 59 IU/L (ref 44–121)
ALT SERPL-CCNC: 15 IU/L (ref 0–32)
AST SERPL-CCNC: 25 IU/L (ref 0–40)
BILIRUB SERPL-MCNC: 0.3 MG/DL (ref 0–1.2)
BUN SERPL-MCNC: 6 MG/DL (ref 6–20)
BUN/CREAT SERPL: 10 (ref 9–23)
CALCIUM SERPL-MCNC: 9.2 MG/DL (ref 8.7–10.2)
CHLORIDE SERPL-SCNC: 102 MMOL/L (ref 96–106)
CHOLEST SERPL-MCNC: 189 MG/DL (ref 100–199)
CO2 SERPL-SCNC: 23 MMOL/L (ref 20–29)
CREAT SERPL-MCNC: 0.62 MG/DL (ref 0.57–1)
EGFRCR SERPLBLD CKD-EPI 2021: 117 ML/MIN/1.73
ERYTHROCYTE [DISTWIDTH] IN BLOOD BY AUTOMATED COUNT: 14.7 % (ref 11.7–15.4)
GLOBULIN SER CALC-MCNC: 2.8 G/DL (ref 1.5–4.5)
GLUCOSE SERPL-MCNC: 88 MG/DL (ref 70–99)
HCT VFR BLD AUTO: 36.5 % (ref 34–46.6)
HDLC SERPL-MCNC: 60 MG/DL
HGB BLD-MCNC: 11.4 G/DL (ref 11.1–15.9)
LDLC SERPL CALC-MCNC: 112 MG/DL (ref 0–99)
MCH RBC QN AUTO: 24.3 PG (ref 26.6–33)
MCHC RBC AUTO-ENTMCNC: 31.2 G/DL (ref 31.5–35.7)
MCV RBC AUTO: 78 FL (ref 79–97)
PLATELET # BLD AUTO: 278 X10E3/UL (ref 150–450)
POTASSIUM SERPL-SCNC: 4.2 MMOL/L (ref 3.5–5.2)
PROT SERPL-MCNC: 7.3 G/DL (ref 6–8.5)
RBC # BLD AUTO: 4.69 X10E6/UL (ref 3.77–5.28)
SODIUM SERPL-SCNC: 139 MMOL/L (ref 134–144)
TRIGL SERPL-MCNC: 97 MG/DL (ref 0–149)
VLDLC SERPL CALC-MCNC: 17 MG/DL (ref 5–40)
WBC # BLD AUTO: 5.3 X10E3/UL (ref 3.4–10.8)

## 2024-12-02 ENCOUNTER — OFFICE VISIT (OUTPATIENT)
Dept: PRIMARY CARE CLINIC | Facility: CLINIC | Age: 39
End: 2024-12-02
Payer: COMMERCIAL

## 2024-12-02 VITALS
SYSTOLIC BLOOD PRESSURE: 128 MMHG | OXYGEN SATURATION: 99 % | HEIGHT: 62 IN | RESPIRATION RATE: 20 BRPM | HEART RATE: 89 BPM | WEIGHT: 134 LBS | DIASTOLIC BLOOD PRESSURE: 85 MMHG | BODY MASS INDEX: 24.66 KG/M2 | TEMPERATURE: 97.8 F

## 2024-12-02 DIAGNOSIS — Z00.00 WELL WOMAN EXAM (NO GYNECOLOGICAL EXAM): Primary | ICD-10-CM

## 2024-12-02 DIAGNOSIS — Z01.84 IMMUNITY STATUS TESTING: ICD-10-CM

## 2024-12-02 PROCEDURE — 99395 PREV VISIT EST AGE 18-39: CPT | Performed by: FAMILY MEDICINE

## 2024-12-02 ASSESSMENT — PATIENT HEALTH QUESTIONNAIRE - PHQ9
SUM OF ALL RESPONSES TO PHQ QUESTIONS 1-9: 0
1. LITTLE INTEREST OR PLEASURE IN DOING THINGS: NOT AT ALL
2. FEELING DOWN, DEPRESSED OR HOPELESS: NOT AT ALL
SUM OF ALL RESPONSES TO PHQ QUESTIONS 1-9: 0
SUM OF ALL RESPONSES TO PHQ9 QUESTIONS 1 & 2: 0

## 2024-12-02 NOTE — PROGRESS NOTES
HPI     Chief Complaint   Patient presents with    Annual Exam     Physical and labs     History of Present Illness  The patient is a 39-year-old female who is coming in for an annual exam.    She maintains a vegetarian diet, which includes dairy products such as cheese. She engages in physical activity by walking for 2 hours daily. She has never used tobacco or consumed alcohol. She has declined testing for sexually transmitted diseases. She has received one dose of the hepatitis B vaccine but does not wish to receive the second dose at this time. Her last Pap smear was conducted in 2021. She regularly visits her dentist. Her menstrual cycles are regular.    FAMILY HISTORY  She has no family history of breast cancer or colon cancer.    IMMUNIZATIONS  She is up to date on her tetanus and COVID-19 vaccines.     Reviewed PmHx, RxHx, FmHx, SocHx, AllgHx and updated and dated in the chart.    Physical Exam:  /85   Pulse 89   Temp 97.8 °F (36.6 °C) (Oral)   Resp 20   Ht 1.575 m (5' 2\")   Wt 60.8 kg (134 lb)   LMP 11/28/2024 (Exact Date)   SpO2 99%   BMI 24.51 kg/m²     Physical Exam         Results          No results found for this or any previous visit (from the past 12 hour(s)).}        Assessment / Plan       ICD-10-CM    1. Well woman exam (no gynecological exam)  Z00.00 CBC     Comprehensive Metabolic Panel     Lipid Panel      2. Immunity status testing  Z01.84 Varicella Zoster Antibody, IgG     Hepatitis B Surface Antibody           Assessment & Plan  1. Annual exam.  Varicella and hepatitis B titers will be checked. Routine blood work, including blood counts, kidney and liver function, and cholesterol, will be ordered. She is advised to fast for at least 8 hours prior to the blood draw.        I have discussed the diagnosis with the patient and the intended plan as seen in the above orders. The patient has received an after-visit summary and questions were answered concerning future plans.  I

## 2024-12-02 NOTE — PROGRESS NOTES
Health Decision Maker has been checked with the patient      AI form was signed    Chief Complaint   Patient presents with    Annual Exam       \"Have you been to the ER, urgent care clinic since your last visit?  Hospitalized since your last visit?\"    NO    “Have you seen or consulted any other health care providers outside of [facility] since your last visit?”    NO      There were no vitals filed for this visit.   Depression: Not at risk (12/4/2023)    PHQ-2     PHQ-2 Score: 0              Click Here for Release of Records Request    Chart reviewed: immunizations are documented.   Immunization History   Administered Date(s) Administered    COVID-19, MODERNA BLUE border, Primary or Immunocompromised, (age 12y+), IM, 100 mcg/0.5mL 05/05/2021, 06/05/2021    Influenza Virus Vaccine 10/20/2021    Influenza, FLUARIX, FLULAVAL, FLUZONE (age 6 mo+) and AFLURIA, (age 3 y+), Quadv PF, 0.5mL 10/27/2020, 10/20/2021    TDaP, ADACEL (age 10y-64y), BOOSTRIX (age 10y+), IM, 0.5mL 08/19/2015

## 2024-12-06 DIAGNOSIS — Z00.00 WELL WOMAN EXAM (NO GYNECOLOGICAL EXAM): ICD-10-CM

## 2024-12-06 DIAGNOSIS — Z01.84 IMMUNITY STATUS TESTING: ICD-10-CM

## 2024-12-07 LAB
ALBUMIN SERPL-MCNC: 4.2 G/DL (ref 3.9–4.9)
ALP SERPL-CCNC: 61 IU/L (ref 44–121)
ALT SERPL-CCNC: 18 IU/L (ref 0–32)
AST SERPL-CCNC: 23 IU/L (ref 0–40)
BILIRUB SERPL-MCNC: 0.4 MG/DL (ref 0–1.2)
BUN SERPL-MCNC: 6 MG/DL (ref 6–20)
BUN/CREAT SERPL: 9 (ref 9–23)
CALCIUM SERPL-MCNC: 9 MG/DL (ref 8.7–10.2)
CHLORIDE SERPL-SCNC: 103 MMOL/L (ref 96–106)
CHOLEST SERPL-MCNC: 214 MG/DL (ref 100–199)
CO2 SERPL-SCNC: 23 MMOL/L (ref 20–29)
CREAT SERPL-MCNC: 0.65 MG/DL (ref 0.57–1)
EGFRCR SERPLBLD CKD-EPI 2021: 115 ML/MIN/1.73
ERYTHROCYTE [DISTWIDTH] IN BLOOD BY AUTOMATED COUNT: 14.4 % (ref 11.7–15.4)
GLOBULIN SER CALC-MCNC: 3 G/DL (ref 1.5–4.5)
GLUCOSE SERPL-MCNC: 88 MG/DL (ref 70–99)
HBV SURFACE AB SER QL: REACTIVE
HCT VFR BLD AUTO: 35.2 % (ref 34–46.6)
HDLC SERPL-MCNC: 64 MG/DL
HGB BLD-MCNC: 10.8 G/DL (ref 11.1–15.9)
LDLC SERPL CALC-MCNC: 128 MG/DL (ref 0–99)
MCH RBC QN AUTO: 24.3 PG (ref 26.6–33)
MCHC RBC AUTO-ENTMCNC: 30.7 G/DL (ref 31.5–35.7)
MCV RBC AUTO: 79 FL (ref 79–97)
PLATELET # BLD AUTO: 304 X10E3/UL (ref 150–450)
POTASSIUM SERPL-SCNC: 4.7 MMOL/L (ref 3.5–5.2)
PROT SERPL-MCNC: 7.2 G/DL (ref 6–8.5)
RBC # BLD AUTO: 4.44 X10E6/UL (ref 3.77–5.28)
SODIUM SERPL-SCNC: 140 MMOL/L (ref 134–144)
TRIGL SERPL-MCNC: 124 MG/DL (ref 0–149)
VLDLC SERPL CALC-MCNC: 22 MG/DL (ref 5–40)
VZV IGG SER QL IA: REACTIVE
WBC # BLD AUTO: 6.2 X10E3/UL (ref 3.4–10.8)

## 2024-12-10 DIAGNOSIS — D64.9 ANEMIA, UNSPECIFIED TYPE: Primary | ICD-10-CM

## 2024-12-10 DIAGNOSIS — D64.9 ANEMIA, UNSPECIFIED TYPE: ICD-10-CM
